# Patient Record
Sex: MALE | Race: WHITE | URBAN - METROPOLITAN AREA
[De-identification: names, ages, dates, MRNs, and addresses within clinical notes are randomized per-mention and may not be internally consistent; named-entity substitution may affect disease eponyms.]

---

## 2021-07-22 ENCOUNTER — OFFICE VISIT (OUTPATIENT)
Dept: INTERNAL MEDICINE CLINIC | Age: 26
End: 2021-07-22
Payer: COMMERCIAL

## 2021-07-22 VITALS
WEIGHT: 195.2 LBS | HEART RATE: 78 BPM | DIASTOLIC BLOOD PRESSURE: 70 MMHG | OXYGEN SATURATION: 98 % | SYSTOLIC BLOOD PRESSURE: 124 MMHG | HEIGHT: 72 IN | BODY MASS INDEX: 26.44 KG/M2

## 2021-07-22 DIAGNOSIS — Z00.00 ROUTINE GENERAL MEDICAL EXAMINATION AT A HEALTH CARE FACILITY: ICD-10-CM

## 2021-07-22 PROCEDURE — 86580 TB INTRADERMAL TEST: CPT | Performed by: NURSE PRACTITIONER

## 2021-07-22 PROCEDURE — 99385 PREV VISIT NEW AGE 18-39: CPT | Performed by: NURSE PRACTITIONER

## 2021-07-22 PROCEDURE — 90715 TDAP VACCINE 7 YRS/> IM: CPT | Performed by: NURSE PRACTITIONER

## 2021-07-22 PROCEDURE — 90471 IMMUNIZATION ADMIN: CPT | Performed by: NURSE PRACTITIONER

## 2021-07-22 ASSESSMENT — ENCOUNTER SYMPTOMS
VOMITING: 0
NAUSEA: 0
SHORTNESS OF BREATH: 0
BLOOD IN STOOL: 0
CHEST TIGHTNESS: 0
COUGH: 0
WHEEZING: 0
EYE ITCHING: 0
SORE THROAT: 0
DIARRHEA: 0
RHINORRHEA: 0
COLOR CHANGE: 0
EYE REDNESS: 0
ABDOMINAL PAIN: 0
BACK PAIN: 0
SINUS PRESSURE: 0
CONSTIPATION: 0

## 2021-07-22 ASSESSMENT — PATIENT HEALTH QUESTIONNAIRE - PHQ9
2. FEELING DOWN, DEPRESSED OR HOPELESS: 0
SUM OF ALL RESPONSES TO PHQ QUESTIONS 1-9: 0
SUM OF ALL RESPONSES TO PHQ9 QUESTIONS 1 & 2: 0
SUM OF ALL RESPONSES TO PHQ QUESTIONS 1-9: 0
SUM OF ALL RESPONSES TO PHQ QUESTIONS 1-9: 0
1. LITTLE INTEREST OR PLEASURE IN DOING THINGS: 0

## 2021-07-22 NOTE — ASSESSMENT & PLAN NOTE
Well exam  Cleared for school   Immunization list printed  Given tdap and tb test today   Aware of return

## 2021-07-22 NOTE — PROGRESS NOTES
Subjective:     CC:  New Patient      HPI:  Florina Daley is here for a comprehensive physical exam and to update his immunizations for school. He is in need of his tdap and step 1 of 2 of tb testing. He has no concerns today. Vitals:    07/22/21 1431   BP: 124/70   Pulse: 78   SpO2: 98%       Wt Readings from Last 3 Encounters:   07/22/21 195 lb 3.2 oz (88.5 kg)       Past Medical History:   Diagnosis Date    ACL (anterior cruciate ligament) rupture     3 acl on left knee       History reviewed. No pertinent surgical history. History reviewed. No pertinent family history.     Social History     Tobacco Use    Smoking status: Never Smoker    Smokeless tobacco: Current User    Tobacco comment: once daily   Vaping Use    Vaping Use: Never assessed   Substance Use Topics    Alcohol use: Yes     Comment: weekends    Drug use: Never       Immunization History   Administered Date(s) Administered    COVID-19, J&J, PF, 0.5 mL 07/07/2021    DTaP, 5 Pertussis Antigens (Daptacel) 1994, 1994, 02/16/1995, 07/27/1996, 08/17/1999    Hepatitis B 1994, 1994, 06/20/1995    Hib (HbOC) 1994, 01/11/1995, 04/19/1995, 08/17/1995    MMR 11/21/1995, 08/12/1999    Meningococcal ACWY Vaccine 01/05/2011, 06/06/2012    PPD Test 06/19/2015, 06/26/2015, 07/22/2021    Polio IPV (IPOL) 1994, 1994, 02/16/1995, 02/27/1996    Tdap (Boostrix, Adacel) 01/05/2011, 07/22/2021    Varicella (Varivax) 09/04/1997, 01/14/2009       Health Maintenance   Topic Date Due    Hepatitis C screen  Never done    HPV vaccine (1 - Male 2-dose series) Never done    HIV screen  Never done    Flu vaccine (1) 09/01/2021    DTaP/Tdap/Td vaccine (8 - Td or Tdap) 07/22/2031    Hepatitis B vaccine  Completed    Varicella vaccine  Completed    Meningococcal (ACWY) vaccine  Completed    COVID-19 Vaccine  Completed    Hepatitis A vaccine  Aged Out    Hib vaccine  Aged Out    Pneumococcal 0-64 years Vaccine  Aged Out       Review of Systems   Constitutional: Negative for chills, fatigue and fever. HENT: Negative for congestion, ear pain, postnasal drip, rhinorrhea, sinus pressure, sneezing and sore throat. Eyes: Negative for redness and itching. Respiratory: Negative for cough, chest tightness, shortness of breath and wheezing. Cardiovascular: Negative for chest pain and palpitations. Gastrointestinal: Negative for abdominal pain, blood in stool, constipation, diarrhea, nausea and vomiting. Endocrine: Negative for cold intolerance and heat intolerance. Genitourinary: Negative for difficulty urinating, dysuria, flank pain, frequency, hematuria and urgency. Musculoskeletal: Negative for arthralgias, back pain, joint swelling and myalgias. Skin: Negative for color change, pallor, rash and wound. Allergic/Immunologic: Negative for environmental allergies and food allergies. Neurological: Negative for dizziness, seizures, syncope, weakness, light-headedness, numbness and headaches. Hematological: Negative for adenopathy. Does not bruise/bleed easily. Psychiatric/Behavioral: Negative for confusion, sleep disturbance and suicidal ideas. The patient is not nervous/anxious and is not hyperactive. Objective:     Physical Exam  Constitutional:       Appearance: Normal appearance. He is well-developed and normal weight. HENT:      Head: Normocephalic and atraumatic. Right Ear: Hearing, tympanic membrane, ear canal and external ear normal.      Left Ear: Hearing, tympanic membrane, ear canal and external ear normal.      Nose: Nose normal. No mucosal edema. Right Sinus: No maxillary sinus tenderness or frontal sinus tenderness. Left Sinus: No maxillary sinus tenderness or frontal sinus tenderness. Mouth/Throat:      Mouth: Mucous membranes are dry. Tonsils: No tonsillar abscesses. Eyes:      Extraocular Movements: Extraocular movements intact. Conjunctiva/sclera: Conjunctivae normal.      Pupils: Pupils are equal, round, and reactive to light. Cardiovascular:      Rate and Rhythm: Normal rate and regular rhythm. Pulses: Normal pulses. Heart sounds: Normal heart sounds. Pulmonary:      Effort: Pulmonary effort is normal.      Breath sounds: Normal breath sounds. Abdominal:      General: Abdomen is flat. Bowel sounds are normal.      Palpations: Abdomen is soft. Tenderness: There is no abdominal tenderness. Musculoskeletal:         General: Normal range of motion. Cervical back: Normal range of motion and neck supple. Lymphadenopathy:      Head:      Right side of head: No submental, submandibular, tonsillar, preauricular, posterior auricular or occipital adenopathy. Left side of head: No submental, submandibular, tonsillar, preauricular, posterior auricular or occipital adenopathy. Skin:     General: Skin is warm and dry. Neurological:      General: No focal deficit present. Mental Status: He is alert and oriented to person, place, and time. Psychiatric:         Mood and Affect: Mood normal.         Behavior: Behavior normal.         Assessment:      See ProblemList assessment and plan       PHQ Scores 7/22/2021   PHQ2 Score 0   PHQ9 Score 0     Interpretation of Total Score Depression Severity: 1-4 = Minimal depression, 5-9 = Milddepression, 10-14 = Moderate depression, 15-19 = Moderately severe depression, 20-27 = Severe depression    Plan:      Routine general medical examination at a health care facility   Well exam  Cleared for school   Immunization list printed  Given tdap and tb test today   Aware of return                  Discussed over the counter medication with patient.     Star Hernandez received counseling on the following healthybehaviors: nutrition, exercise, and medication adherence    Patient given educational materials on their chronic medical conditions    Discussed use, benefit, and side effects of

## 2021-08-21 PROBLEM — Z00.00 ROUTINE GENERAL MEDICAL EXAMINATION AT A HEALTH CARE FACILITY: Status: RESOLVED | Noted: 2021-07-22 | Resolved: 2021-08-21
